# Patient Record
Sex: MALE | Race: BLACK OR AFRICAN AMERICAN | URBAN - METROPOLITAN AREA
[De-identification: names, ages, dates, MRNs, and addresses within clinical notes are randomized per-mention and may not be internally consistent; named-entity substitution may affect disease eponyms.]

---

## 2017-05-25 ENCOUNTER — OFFICE VISIT (OUTPATIENT)
Dept: FAMILY MEDICINE | Facility: CLINIC | Age: 27
End: 2017-05-25

## 2017-05-25 VITALS
WEIGHT: 252.13 LBS | TEMPERATURE: 97.8 F | SYSTOLIC BLOOD PRESSURE: 119 MMHG | HEIGHT: 71 IN | HEART RATE: 81 BPM | BODY MASS INDEX: 35.3 KG/M2 | OXYGEN SATURATION: 97 % | RESPIRATION RATE: 20 BRPM | DIASTOLIC BLOOD PRESSURE: 75 MMHG

## 2017-05-25 DIAGNOSIS — Z22.39 CARRIER OF UREAPLASMA UREALYTICUM: Primary | ICD-10-CM

## 2017-05-25 RX ORDER — DOXYCYCLINE 100 MG/1
100 CAPSULE ORAL 2 TIMES DAILY
Qty: 14 CAPSULE | Refills: 0 | Status: SHIPPED | OUTPATIENT
Start: 2017-05-25

## 2017-05-25 NOTE — PROGRESS NOTES
"Li is a 26-year-old male presenting with concern that he has a sexually transmitted infection. He states that his girlfriend informed him that she has a \"bacterial\" infection and that he needs to be treated.   He denies dysuria, hematuria, penile discharge, or skin lesions. Does not use condoms. Monogamous relationship with 1 female partner.    Otherwise healthy, hx of gonorrhea at age 16 for which he was treated.  No surgeries or hospitalizations.  No daily medications  Allergy to prednisone (itching)    ROS:  CONST: no fevers, chills, no changes to weight   SKIN: no rashes, lesions, changes to skin  RESP: no cough, dyspnea  ENT: no changes to vision or hearing, no pharyngitis     Physical Exam   /75  Pulse 81  Temp 97.8  F (36.6  C) (Oral)  Resp 20  Ht 5' 10.5\" (179.1 cm)  Wt 252 lb 2 oz (114.4 kg)  SpO2 97%  BMI 35.66 kg/m2   Well- appearing, no acute distress   Heart is regular in rate and rhythm  Lungs are clear    ASSESSMENT AND PLAN  1. Carrier of ureaplasma urealyticum  We were able to talk to his girlfriend on the phone. Due to recurrent BV, she had a culture done showing ureaplasma. Reviewed partner treatment recommendations. Will treat with 1 week of doxycycline 100 mg BID. Reviewed common side effects.  - doxycycline (VIBRAMYCIN) 100 MG capsule; Take 1 capsule (100 mg) by mouth 2 times daily  Dispense: 14 capsule; Refill: 0    Follow up prn.    This note is scribed by Talya Schmitz, medical student on behalf of ABBI ANDERSON.    The medical student acted as a scribe and the encounter documented above was performed completely by me and the documentation accurately reflects the work I have performed today. MD Abbi Schultz MD  [facility] [place] Resident  Pager# [phone]    Precepted patient with Dr. Beverly Bowie.            "

## 2017-05-25 NOTE — MR AVS SNAPSHOT
After Visit Summary   2017    Li Villa    MRN: [id number]           Patient Information     Date Of Birth          1990        Visit Information        Provider Department      2017 8:40 AM Abbi Mauro MD [facility]        Today's Diagnoses     Carrier of ureaplasma urealyticum    -  1       Follow-ups after your visit        Follow-up notes from your care team     Return if symptoms worsen or fail to improve.      Who to contact     Please call your clinic at [phone] to:    Ask questions about your health    Make or cancel appointments    Discuss your medicines    Learn about your test results    Speak to your doctor   If you have compliments or concerns about an experience at your clinic, or if you wish to file a complaint, please contact [facility] Patient Relations at [phone] or email us at [email or web address]         Additional Information About Your Visit        MyChart Information     [employer] is an electronic gateway that provides easy, online access to your medical records. With [employer], you can request a clinic appointment, read your test results, renew a prescription or communicate with your care team.     To sign up for [employer] visit the website at [email or web address]   You will be asked to enter the access code listed below, as well as some personal information. Please follow the directions to create your username and password.     Your access code is: [email or web address]  Expires: 2017  5:22 PM     Your access code will  in 90 days. If you need help or a new code, please contact your [facility] or call [phone] for assistance.        Care EveryWhere ID     This is your Care EveryWhere ID. This could be used by other organizations to access your [place] medical records  [id number]        Your Vitals Were     Pulse Temperature Respirations Height Pulse  "Oximetry BMI (Body Mass Index)    81 97.8  F (36.6  C) (Oral) 20 5' 10.5\" (179.1 cm) 97% 35.66 kg/m2       Blood Pressure from Last 3 Encounters:   05/25/17 119/75    Weight from Last 3 Encounters:   05/25/17 252 lb 2 oz (114.4 kg)              Today, you had the following     No orders found for display         Today's Medication Changes          These changes are accurate as of: 5/25/17 11:59 PM.  If you have any questions, ask your nurse or doctor.               Start taking these medicines.        Dose/Directions    doxycycline 100 MG capsule   Commonly known as:  VIBRAMYCIN   Used for:  Carrier of ureaplasma urealyticum   Started by:  Abbi Mauro MD        Dose:  100 mg   Take 1 capsule (100 mg) by mouth 2 times daily   Quantity:  14 capsule   Refills:  0            Where to get your medicines      These medications were sent to [id number] - [place], [place] - [address], [place] [address]     Phone:  [phone]     doxycycline 100 MG capsule                Primary Care Provider Office Phone # Fax #    Abbi Mauro [phone]       [facility] [address] [place] [address]        Thank you!     Thank you for choosing [facility]  for your care. Our goal is always to provide you with excellent care. Hearing back from our patients is one way we can continue to improve our services. Please take a few minutes to complete the written survey that you may receive in the mail after your visit with us. Thank you!             Your Updated Medication List - Protect others around you: Learn how to safely use, store and throw away your medicines at www.disposemymeds.org.          This list is accurate as of: 5/25/17 11:59 PM.  Always use your most recent med list.                   Brand Name Dispense Instructions for use    doxycycline 100 MG capsule    VIBRAMYCIN    " 14 capsule    Take 1 capsule (100 mg) by mouth 2 times daily

## 2017-05-28 NOTE — PROGRESS NOTES
Preceptor Attestation:  Patient's case reviewed and discussed with Abbi Mauro MD resident and I evaluated the patient. I agree with written assessment and plan of care.  Supervising Physician:  MARTHA MCKEON MD  [facility]